# Patient Record
Sex: FEMALE | Race: WHITE | NOT HISPANIC OR LATINO | Employment: OTHER | URBAN - METROPOLITAN AREA
[De-identification: names, ages, dates, MRNs, and addresses within clinical notes are randomized per-mention and may not be internally consistent; named-entity substitution may affect disease eponyms.]

---

## 2023-09-01 ENCOUNTER — HOSPITAL ENCOUNTER (OUTPATIENT)
Dept: DATA CONVERSION | Facility: HOSPITAL | Age: 83
Discharge: HOME | End: 2023-09-01
Payer: MEDICARE

## 2023-09-01 DIAGNOSIS — Z12.31 ENCOUNTER FOR SCREENING MAMMOGRAM FOR MALIGNANT NEOPLASM OF BREAST: ICD-10-CM

## 2023-09-11 PROBLEM — K64.8 BLEEDING INTERNAL HEMORRHOIDS: Status: ACTIVE | Noted: 2023-09-11

## 2023-09-11 PROBLEM — G45.9 TRANSIENT ISCHEMIC ATTACK: Status: ACTIVE | Noted: 2023-09-11

## 2023-09-11 PROBLEM — M16.11 OSTEOARTHRITIS OF RIGHT HIP: Status: ACTIVE | Noted: 2023-09-11

## 2023-09-11 PROBLEM — R73.03 PREDIABETES: Status: ACTIVE | Noted: 2023-09-11

## 2023-09-11 PROBLEM — G45.4 TRANSIENT GLOBAL AMNESIA: Status: ACTIVE | Noted: 2023-09-11

## 2023-09-11 PROBLEM — C44.329 SQUAMOUS CELL CARCINOMA OF SKIN OF OTHER PARTS OF FACE: Status: ACTIVE | Noted: 2021-07-20

## 2023-09-11 PROBLEM — Z96.641 HISTORY OF TOTAL RIGHT HIP ARTHROPLASTY: Status: ACTIVE | Noted: 2023-09-11

## 2023-09-11 PROBLEM — M19.91 PRIMARY OSTEOARTHRITIS: Status: ACTIVE | Noted: 2023-09-11

## 2023-09-11 PROBLEM — E66.9 OBESITY WITH BODY MASS INDEX 30 OR GREATER: Status: ACTIVE | Noted: 2023-09-11

## 2023-09-11 PROBLEM — M16.10 PRIMARY LOCALIZED OSTEOARTHRITIS OF PELVIC REGION AND THIGH: Status: ACTIVE | Noted: 2023-09-11

## 2023-09-11 PROBLEM — D75.89 MACROCYTOSIS WITHOUT ANEMIA: Status: ACTIVE | Noted: 2023-09-11

## 2023-09-11 PROBLEM — E28.39 ESTROGEN DEFICIENCY: Status: ACTIVE | Noted: 2023-09-11

## 2023-09-11 PROBLEM — M16.12 PRIMARY OSTEOARTHRITIS OF LEFT HIP: Status: ACTIVE | Noted: 2023-09-11

## 2023-09-11 PROBLEM — E78.5 HYPERLIPIDEMIA: Status: ACTIVE | Noted: 2023-09-11

## 2023-09-11 PROBLEM — Z96.642 HISTORY OF TOTAL LEFT HIP ARTHROPLASTY: Status: ACTIVE | Noted: 2023-09-11

## 2023-09-11 PROBLEM — G47.00 INSOMNIA: Status: ACTIVE | Noted: 2023-09-11

## 2023-09-11 PROBLEM — N95.9 MENOPAUSAL AND POSTMENOPAUSAL DISORDER: Status: ACTIVE | Noted: 2023-09-11

## 2023-09-11 RX ORDER — LATANOPROST 50 UG/ML
SOLUTION/ DROPS OPHTHALMIC
COMMUNITY

## 2023-09-11 RX ORDER — FLUOROURACIL 50 MG/G
CREAM TOPICAL
COMMUNITY
Start: 2023-01-10

## 2023-09-11 RX ORDER — ATORVASTATIN CALCIUM 20 MG/1
TABLET, FILM COATED ORAL
COMMUNITY
End: 2024-03-07

## 2023-09-11 RX ORDER — ASPIRIN 81 MG/1
TABLET ORAL
COMMUNITY

## 2023-12-18 ENCOUNTER — TELEPHONE (OUTPATIENT)
Dept: PRIMARY CARE | Facility: CLINIC | Age: 83
End: 2023-12-18
Payer: MEDICARE

## 2023-12-18 DIAGNOSIS — U07.1 COVID: Primary | ICD-10-CM

## 2023-12-18 RX ORDER — NIRMATRELVIR AND RITONAVIR 300-100 MG
3 KIT ORAL 2 TIMES DAILY
Qty: 30 TABLET | Refills: 0 | Status: SHIPPED | OUTPATIENT
Start: 2023-12-18 | End: 2023-12-23

## 2023-12-18 RX ORDER — NIRMATRELVIR AND RITONAVIR 300-100 MG
3 KIT ORAL 2 TIMES DAILY
Qty: 30 TABLET | Refills: 0 | Status: SHIPPED | OUTPATIENT
Start: 2023-12-18 | End: 2023-12-18 | Stop reason: SDUPTHER

## 2023-12-18 NOTE — TELEPHONE ENCOUNTER
[facility] states that [facility] not have paxlovid. Requesting it be resent to an[facility]. New pharmacy added to chart.

## 2023-12-18 NOTE — TELEPHONE ENCOUNTER
Patient c/o sore throat, sinus congestion, headache, fever since 12/15. Taking tylenol OTC. COVID+ this morning. Please advise.

## 2024-03-05 ENCOUNTER — TELEPHONE (OUTPATIENT)
Dept: PRIMARY CARE | Facility: CLINIC | Age: 84
End: 2024-03-05
Payer: MEDICARE

## 2024-03-07 DIAGNOSIS — E78.2 MIXED HYPERLIPIDEMIA: Primary | ICD-10-CM

## 2024-03-07 RX ORDER — ATORVASTATIN CALCIUM 20 MG/1
20 TABLET, FILM COATED ORAL DAILY
Qty: 90 TABLET | Refills: 2 | Status: SHIPPED | OUTPATIENT
Start: 2024-03-07

## 2024-06-14 ENCOUNTER — LAB (OUTPATIENT)
Dept: LAB | Facility: LAB | Age: 84
End: 2024-06-14
Payer: MEDICARE

## 2024-06-14 DIAGNOSIS — R73.09 OTHER ABNORMAL GLUCOSE: ICD-10-CM

## 2024-06-14 DIAGNOSIS — E78.5 HYPERLIPIDEMIA, UNSPECIFIED: ICD-10-CM

## 2024-06-14 DIAGNOSIS — D75.89 OTHER SPECIFIED DISEASES OF BLOOD AND BLOOD-FORMING ORGANS: Primary | ICD-10-CM

## 2024-06-14 LAB
ALBUMIN SERPL-MCNC: 4.2 G/DL (ref 3.5–5)
ALP BLD-CCNC: 87 U/L (ref 35–125)
ALT SERPL-CCNC: 15 U/L (ref 5–40)
ANION GAP SERPL CALC-SCNC: 12 MMOL/L
AST SERPL-CCNC: 23 U/L (ref 5–40)
BILIRUB SERPL-MCNC: 0.4 MG/DL (ref 0.1–1.2)
BUN SERPL-MCNC: 26 MG/DL (ref 8–25)
CALCIUM SERPL-MCNC: 9.7 MG/DL (ref 8.5–10.4)
CHLORIDE SERPL-SCNC: 103 MMOL/L (ref 97–107)
CHOLEST SERPL-MCNC: 206 MG/DL (ref 133–200)
CHOLEST/HDLC SERPL: 2.6 {RATIO}
CO2 SERPL-SCNC: 25 MMOL/L (ref 24–31)
CREAT SERPL-MCNC: 0.9 MG/DL (ref 0.4–1.6)
EGFRCR SERPLBLD CKD-EPI 2021: 64 ML/MIN/1.73M*2
ERYTHROCYTE [DISTWIDTH] IN BLOOD BY AUTOMATED COUNT: 14.4 % (ref 11.5–14.5)
EST. AVERAGE GLUCOSE BLD GHB EST-MCNC: 123 MG/DL
GLUCOSE SERPL-MCNC: 117 MG/DL (ref 65–99)
HBA1C MFR BLD: 5.9 %
HCT VFR BLD AUTO: 43.1 % (ref 36–46)
HDLC SERPL-MCNC: 78 MG/DL
HGB BLD-MCNC: 14.4 G/DL (ref 12–16)
LDLC SERPL CALC-MCNC: 113 MG/DL (ref 65–130)
MCH RBC QN AUTO: 33.3 PG (ref 26–34)
MCHC RBC AUTO-ENTMCNC: 33.4 G/DL (ref 32–36)
MCV RBC AUTO: 100 FL (ref 80–100)
NRBC BLD-RTO: 0 /100 WBCS (ref 0–0)
PLATELET # BLD AUTO: 241 X10*3/UL (ref 150–450)
POTASSIUM SERPL-SCNC: 4.8 MMOL/L (ref 3.4–5.1)
PROT SERPL-MCNC: 6.7 G/DL (ref 5.9–7.9)
RBC # BLD AUTO: 4.33 X10*6/UL (ref 4–5.2)
SODIUM SERPL-SCNC: 140 MMOL/L (ref 133–145)
TRIGL SERPL-MCNC: 76 MG/DL (ref 40–150)
WBC # BLD AUTO: 7.6 X10*3/UL (ref 4.4–11.3)

## 2024-06-14 PROCEDURE — 85027 COMPLETE CBC AUTOMATED: CPT

## 2024-06-14 PROCEDURE — 80053 COMPREHEN METABOLIC PANEL: CPT

## 2024-06-14 PROCEDURE — 36415 COLL VENOUS BLD VENIPUNCTURE: CPT

## 2024-06-14 PROCEDURE — 83036 HEMOGLOBIN GLYCOSYLATED A1C: CPT

## 2024-06-14 PROCEDURE — 80061 LIPID PANEL: CPT

## 2024-06-18 PROBLEM — N95.9 MENOPAUSAL AND POSTMENOPAUSAL DISORDER: Status: RESOLVED | Noted: 2023-09-11 | Resolved: 2024-06-18

## 2024-06-18 PROBLEM — K64.8 BLEEDING INTERNAL HEMORRHOIDS: Status: RESOLVED | Noted: 2023-09-11 | Resolved: 2024-06-18

## 2024-06-18 PROBLEM — Z96.641 HISTORY OF TOTAL RIGHT HIP REPLACEMENT: Status: RESOLVED | Noted: 2024-06-18 | Resolved: 2024-06-18

## 2024-06-18 PROBLEM — M16.10 PRIMARY LOCALIZED OSTEOARTHRITIS OF PELVIC REGION AND THIGH: Status: RESOLVED | Noted: 2023-09-11 | Resolved: 2024-06-18

## 2024-06-18 PROBLEM — Z96.642 HISTORY OF TOTAL LEFT HIP ARTHROPLASTY: Status: RESOLVED | Noted: 2023-09-11 | Resolved: 2024-06-18

## 2024-06-18 PROBLEM — E66.9 OBESITY WITH BODY MASS INDEX 30 OR GREATER: Status: RESOLVED | Noted: 2023-09-11 | Resolved: 2024-06-18

## 2024-06-18 PROBLEM — Z96.641 HISTORY OF TOTAL RIGHT HIP REPLACEMENT: Status: ACTIVE | Noted: 2024-06-18

## 2024-06-18 PROBLEM — G45.4 TRANSIENT GLOBAL AMNESIA: Status: RESOLVED | Noted: 2023-09-11 | Resolved: 2024-06-18

## 2024-06-18 PROBLEM — Z96.641 HISTORY OF TOTAL RIGHT HIP ARTHROPLASTY: Status: RESOLVED | Noted: 2023-09-11 | Resolved: 2024-06-18

## 2024-06-18 PROBLEM — Z96.642 HISTORY OF TOTAL LEFT HIP REPLACEMENT: Status: ACTIVE | Noted: 2024-06-18

## 2024-06-18 PROBLEM — M16.11 OSTEOARTHRITIS OF RIGHT HIP: Status: RESOLVED | Noted: 2023-09-11 | Resolved: 2024-06-18

## 2024-06-18 PROBLEM — E28.39 ESTROGEN DEFICIENCY: Status: RESOLVED | Noted: 2023-09-11 | Resolved: 2024-06-18

## 2024-06-18 PROBLEM — C44.329 SQUAMOUS CELL CARCINOMA OF SKIN OF OTHER PARTS OF FACE: Status: RESOLVED | Noted: 2021-07-20 | Resolved: 2024-06-18

## 2024-06-18 PROBLEM — M16.12 PRIMARY OSTEOARTHRITIS OF LEFT HIP: Status: RESOLVED | Noted: 2023-09-11 | Resolved: 2024-06-18

## 2024-06-18 PROBLEM — Z20.822 CONTACT WITH AND (SUSPECTED) EXPOSURE TO COVID-19: Status: ACTIVE | Noted: 2023-02-21

## 2024-06-18 PROBLEM — Z96.642 HISTORY OF TOTAL LEFT HIP REPLACEMENT: Status: RESOLVED | Noted: 2024-06-18 | Resolved: 2024-06-18

## 2024-06-18 PROBLEM — Z20.822 CONTACT WITH AND (SUSPECTED) EXPOSURE TO COVID-19: Status: RESOLVED | Noted: 2023-02-21 | Resolved: 2024-06-18

## 2024-06-18 PROBLEM — G45.9 TRANSIENT ISCHEMIC ATTACK: Status: RESOLVED | Noted: 2023-09-11 | Resolved: 2024-06-18

## 2024-06-18 PROBLEM — H90.3 SENSORINEURAL HEARING LOSS, BILATERAL: Status: ACTIVE | Noted: 2017-11-08

## 2024-06-18 NOTE — PROGRESS NOTES
[facility]: [facility]  MEDICARE WELLNESS EXAM      Martha Cooley is a 84 y.o. female that is presenting today for Annual Exam.    Assessment/Plan    Diagnoses and all orders for this visit:  Annual physical exam  Other screening mammogram  -     BI mammo bilateral screening tomosynthesis; Future  Mixed hyperlipidemia  -     Lipid Panel; Future  -     Comprehensive Metabolic Panel; Future  -     TSH with reflex to Free T4 if abnormal; Future  Primary osteoarthritis of right hip  Prediabetes  Encounter for routine laboratory testing  -     Hemoglobin A1C; Future  Pre-diabetes  -     Hemoglobin A1C; Future  Other orders  -     Follow Up In Primary Care - Medicare Annual; Future  We completed the medicare wellness exam today.  The lifestyle is reviewed and recommendations for diet and exercise are made. We reviewed the immunizations and cancer screening and recommendations for needed immunizations and screenings are made.  The patient is independent in all ADL, shows no clinical signs of cognitive impairment, and is not falling or at risk for such.     In terms of her screenings here are some more specifics.  She is over the age of 82 so she does not need to do any further colon cancer screening.  Last mammogram was in September and another 1 is ordered for this year.  She had a bone density test in 2022 which was totally normal.  I would not plan to do another 1 of those until 2027.  We looked at her immunizations as well she had a Tdap in 2014 and is due for such I recommended that she get that at a local pharmacy.  Her Shingrix vaccines were given x 2 in 2018 and Pneumovax in 2022 meaning that those are fully up-to-date.    We reviewed her list of doctors that she keeps follow-up with.  It is a relatively small list.  She sees a dermatologist, orthopedics, and ophthalmology.  I have included their names in the epic section on the care team.    We took some time to evaluate/manage her  chronic medical problems.  We reviewed the blood work that she most recently had done.  I am glad to report that her prediabetes is stable as it has been for years with no needs to make any changes.  Her cholesterol is also very stable on her current medication with no need to make any changes.  In that regard we will just see her back in 1 year follow-up.    I will plan on seeing her in a year unless she needs me sooner    ADVANCED CARE PLANNING  Advanced Care Planning was discussed with patient:  The patient has an active advanced care plan on file. The patient has an active surrogate decision-maker on file.  Encouraged the patient to confirm that Living Will and Healthcare Power of  (HCPoA) are accurate and up to date.  Encouraged the patient to confirm that our office be provided a copy of any documentation in the event that anything changes.    ACTIVITIES OF DAILY LIVING  Basic ADLs:  Bathing: Independent, Dressing: Independent, Toileting: Independent, Transferring: Independent, Continence: Independent, Feeding: Independent.    Instrumental ADLs:  Ability to use phone: Independent, Shopping: Independent, Cooking: Independent, House-keeping: Independent, Laundry: Independent, Transportation: Independent, Medication Management: Independent, Finance Management: Independent.    Subjective   She is here for her Medicare annual wellness visit and follow-up for her chronic medical problems.  She is really been doing very well since the time that I last saw her her arthritis pains are under good control status post joint replacement surgeries.  She is tolerating her cholesterol-lowering medicine and brings forth really no new issues today.    She has not been falling      Review of Systems   Constitutional: Negative.    HENT: Negative.     Eyes: Negative.    Respiratory: Negative.     Cardiovascular: Negative.    Gastrointestinal: Negative.    Endocrine: Negative.    Genitourinary: Negative.    Musculoskeletal:  Negative.         Hips feel really good s/p joint replacment   Skin: Negative.    Allergic/Immunologic: Negative.    Neurological: Negative.    Hematological: Negative.    Psychiatric/Behavioral: Negative.     All other systems reviewed and are negative.    Objective   Vitals:    06/19/24 0911   BP: 138/78   Pulse: 65   Temp: 36.4 °C (97.5 °F)   SpO2: 97%      Body mass index is 33.8 kg/m².  Physical Exam  Vitals and nursing note reviewed.   Constitutional:       General: She is not in acute distress.     Appearance: Normal appearance. She is not ill-appearing.   HENT:      Head: Normocephalic and atraumatic.      Right Ear: Hearing, tympanic membrane, ear canal and external ear normal. There is no impacted cerumen.      Left Ear: Hearing, tympanic membrane, ear canal and external ear normal. There is no impacted cerumen.      Nose: Nose normal.      Mouth/Throat:      Mouth: Mucous membranes are moist.      Pharynx: Oropharynx is clear. No oropharyngeal exudate or posterior oropharyngeal erythema.   Eyes:      General: No scleral icterus.        Right eye: No discharge.         Left eye: No discharge.      Extraocular Movements: Extraocular movements intact.      Conjunctiva/sclera: Conjunctivae normal.      Pupils: Pupils are equal, round, and reactive to light.   Neck:      Vascular: No carotid bruit.   Cardiovascular:      Rate and Rhythm: Normal rate and regular rhythm.      Pulses: Normal pulses.      Heart sounds: Normal heart sounds. No murmur heard.     No friction rub. No gallop.   Pulmonary:      Effort: Pulmonary effort is normal. No respiratory distress.      Breath sounds: Normal breath sounds.   Abdominal:      General: Abdomen is flat. Bowel sounds are normal. There is no distension.      Palpations: Abdomen is soft.      Tenderness: There is no abdominal tenderness.      Hernia: No hernia is present.   Musculoskeletal:         General: No swelling or tenderness. Normal range of motion.  "  Lymphadenopathy:      Cervical: No cervical adenopathy.   Skin:     General: Skin is warm and dry.      Capillary Refill: Capillary refill takes less than 2 seconds.      Coloration: Skin is not jaundiced.      Findings: No rash.   Neurological:      General: No focal deficit present.      Mental Status: She is alert and oriented to person, place, and time. Mental status is at baseline.   Psychiatric:         Mood and Affect: Mood normal.         Behavior: Behavior normal.       Diagnostic Results   Lab Results   Component Value Date    GLUCOSE 117 (H) 06/14/2024    CALCIUM 9.7 06/14/2024     06/14/2024    K 4.8 06/14/2024    CO2 25 06/14/2024     06/14/2024    BUN 26 (H) 06/14/2024    CREATININE 0.90 06/14/2024     Lab Results   Component Value Date    ALT 15 06/14/2024    AST 23 06/14/2024    ALKPHOS 87 06/14/2024    BILITOT 0.4 06/14/2024     Lab Results   Component Value Date    WBC 7.6 06/14/2024    HGB 14.4 06/14/2024    HCT 43.1 06/14/2024     06/14/2024     06/14/2024     Lab Results   Component Value Date    CHOL 206 (H) 06/14/2024    CHOL 190 06/08/2023    CHOL 189 02/22/2023     Lab Results   Component Value Date    HDL 78.0 06/14/2024    HDL 82 06/08/2023    HDL 76 02/22/2023     Lab Results   Component Value Date    LDLCALC 113 06/14/2024    LDLCALC 97 06/08/2023    LDLCALC 96 02/22/2023     Lab Results   Component Value Date    TRIG 76 06/14/2024    TRIG 56 06/08/2023    TRIG 87 02/22/2023     No components found for: \"CHOLHDL\"  Lab Results   Component Value Date    HGBA1C 5.9 (H) 06/14/2024     Other labs not included in the list above reviewed either before or during this encounter.    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/11/2023    History of total left hip replacement 06/18/2024    History of total right hip arthroplasty 09/11/2023    History of total right hip " replacement 2024    Hyperlipidemia 2023    Insomnia 2023    Macrocytosis without anemia 2023    Osteoarthritis of right hip 2023    Prediabetes 2023    Primary localized osteoarthritis of pelvic region and thigh 2023    Primary osteoarthritis 2023    Primary osteoarthritis of left hip 2023    Sensorineural hearing loss, bilateral 2017    Squamous cell carcinoma of skin of other parts of face 2021    Transient global amnesia 2023    Transient ischemic attack 2023     Past Surgical History:   Procedure Laterality Date    CATARACT EXTRACTION Bilateral 2018    COLONOSCOPY  2006    also , 2016    HYSTEROSCOPY      MR HEAD ANGIO WO IV CONTRAST  2023    MR HEAD ANGIO WO IV CONTRAST [facility]    TOTAL HIP ARTHROPLASTY Right     TOTAL HIP ARTHROPLASTY Left      Family History   Problem Relation Name Age of Onset    Ovarian cancer Mother           of    Heart disease Father      Diabetes Father      Breast cancer Sister       Social History     Socioeconomic History    Marital status:      Spouse name: Not on file    Number of children: Not on file    Years of education: Not on file    Highest education level: Not on file   Occupational History    Not on file   Tobacco Use    Smoking status: Never    Smokeless tobacco: Never   Vaping Use    Vaping status: Never Used   Substance and Sexual Activity    Alcohol use: Defer    Drug use: Never    Sexual activity: Not on file   Other Topics Concern    Not on file   Social History Narrative    Not on file     Social Determinants of Health     Financial Resource Strain: Not on file   Food Insecurity: Not on file   Transportation Needs: Not on file   Physical Activity: Not on file   Stress: Not on file   Social Connections: Not on file   Intimate Partner Violence: Not on file   Housing Stability: Not on file     No Known Allergies  Current Outpatient Medications on  File Prior to Visit   Medication Sig Dispense Refill    atorvastatin (Lipitor) 20 mg tablet TAKE 1 TABLET BY MOUTH EVERY DAY 90 tablet 2    folic acid/multivit-min/lutein (CENTRUM SILVER ORAL) as directed Orally      latanoprost (Xalatan) 0.005 % ophthalmic solution 1 drop into affected eye in the evening Ophthalmic Once a day      NON FORMULARY Vitamin D 25 MCG (1000 UT) - 1 tablet Orally Once a day      aspirin 81 mg EC tablet 1 tablet Orally Once a day      fluorouracil (Efudex) 5 % cream Apply twice daily for 14 days to the upper lip       No current facility-administered medications on file prior to visit.     Immunization History   Administered Date(s) Administered    Flu vaccine (IIV4), preservative free *Check age/dose* 12/27/2021    Flu vaccine, quadrivalent, high-dose, preservative free, age 65y+ (FLUZONE) 10/27/2020, 10/27/2022, 10/17/2023    Influenza, High Dose Seasonal, Preservative Free 12/14/2011, 01/15/2013, 10/30/2013, 09/11/2014, 09/28/2016, 10/04/2017, 10/17/2018, 10/13/2019    Influenza, seasonal, injectable 11/11/2008, 11/11/2010, 10/20/2015    Moderna SARS-CoV-2 Vaccination 02/05/2021, 03/05/2021, 11/12/2021, 05/11/2022    Pfizer COVID-19 vaccine, Fall 2023, 12 years and older, (30mcg/0.3mL) 11/28/2023    Pfizer COVID-19 vaccine, bivalent, age 12 years and older (30 mcg/0.3 mL) 09/20/2022    Pneumococcal conjugate vaccine, 13-valent (PREVNAR 13) 03/04/2015    Pneumococcal conjugate vaccine, 20-valent (PREVNAR 20) 12/07/2022    Pneumococcal polysaccharide vaccine, 23-valent, age 2 years and older (PNEUMOVAX 23) 05/11/2007, 06/19/2012    Td vaccine, age 7 years and older (TDVAX) 03/08/2004    Tdap vaccine, age 7 year and older (BOOSTRIX, ADACEL) 01/21/2014    Zoster vaccine, recombinant, adult (SHINGRIX) 04/02/2018, 04/09/2018, 08/06/2018, 09/11/2018    Zoster, live 05/11/2007     Patient's medical history was reviewed and updated either before or during this encounter.     Gui Glez,  MD

## 2024-06-19 ENCOUNTER — OFFICE VISIT (OUTPATIENT)
Dept: PRIMARY CARE | Facility: CLINIC | Age: 84
End: 2024-06-19
Payer: MEDICARE

## 2024-06-19 VITALS
OXYGEN SATURATION: 97 % | TEMPERATURE: 97.5 F | DIASTOLIC BLOOD PRESSURE: 78 MMHG | SYSTOLIC BLOOD PRESSURE: 138 MMHG | HEART RATE: 65 BPM | BODY MASS INDEX: 33.8 KG/M2 | WEIGHT: 200 LBS

## 2024-06-19 DIAGNOSIS — Z00.00 ANNUAL PHYSICAL EXAM: Primary | ICD-10-CM

## 2024-06-19 DIAGNOSIS — Z12.31 OTHER SCREENING MAMMOGRAM: ICD-10-CM

## 2024-06-19 DIAGNOSIS — R73.03 PREDIABETES: ICD-10-CM

## 2024-06-19 DIAGNOSIS — R73.03 PRE-DIABETES: ICD-10-CM

## 2024-06-19 DIAGNOSIS — M16.11 PRIMARY OSTEOARTHRITIS OF RIGHT HIP: ICD-10-CM

## 2024-06-19 DIAGNOSIS — Z01.89 ENCOUNTER FOR ROUTINE LABORATORY TESTING: ICD-10-CM

## 2024-06-19 DIAGNOSIS — E78.2 MIXED HYPERLIPIDEMIA: ICD-10-CM

## 2024-06-19 PROCEDURE — 99213 OFFICE O/P EST LOW 20 MIN: CPT | Performed by: INTERNAL MEDICINE

## 2024-06-19 PROCEDURE — 1123F ACP DISCUSS/DSCN MKR DOCD: CPT | Performed by: INTERNAL MEDICINE

## 2024-06-19 PROCEDURE — 1159F MED LIST DOCD IN RCRD: CPT | Performed by: INTERNAL MEDICINE

## 2024-06-19 PROCEDURE — 1157F ADVNC CARE PLAN IN RCRD: CPT | Performed by: INTERNAL MEDICINE

## 2024-06-19 PROCEDURE — 1160F RVW MEDS BY RX/DR IN RCRD: CPT | Performed by: INTERNAL MEDICINE

## 2024-06-19 PROCEDURE — 1158F ADVNC CARE PLAN TLK DOCD: CPT | Performed by: INTERNAL MEDICINE

## 2024-06-19 PROCEDURE — 99215 OFFICE O/P EST HI 40 MIN: CPT | Performed by: INTERNAL MEDICINE

## 2024-06-19 PROCEDURE — 1036F TOBACCO NON-USER: CPT | Performed by: INTERNAL MEDICINE

## 2024-06-19 PROCEDURE — G0439 PPPS, SUBSEQ VISIT: HCPCS | Performed by: INTERNAL MEDICINE

## 2024-06-19 PROCEDURE — 1126F AMNT PAIN NOTED NONE PRSNT: CPT | Performed by: INTERNAL MEDICINE

## 2024-06-19 ASSESSMENT — ENCOUNTER SYMPTOMS
OCCASIONAL FEELINGS OF UNSTEADINESS: 0
GASTROINTESTINAL NEGATIVE: 1
DEPRESSION: 0
PSYCHIATRIC NEGATIVE: 1
MUSCULOSKELETAL NEGATIVE: 1
ALLERGIC/IMMUNOLOGIC NEGATIVE: 1
NEUROLOGICAL NEGATIVE: 1
ENDOCRINE NEGATIVE: 1
LOSS OF SENSATION IN FEET: 0
CONSTITUTIONAL NEGATIVE: 1
HEMATOLOGIC/LYMPHATIC NEGATIVE: 1
RESPIRATORY NEGATIVE: 1
CARDIOVASCULAR NEGATIVE: 1
EYES NEGATIVE: 1

## 2024-06-19 ASSESSMENT — PAIN SCALES - GENERAL: PAINLEVEL: 0-NO PAIN

## 2024-06-19 ASSESSMENT — PATIENT HEALTH QUESTIONNAIRE - PHQ9
2. FEELING DOWN, DEPRESSED OR HOPELESS: NOT AT ALL
1. LITTLE INTEREST OR PLEASURE IN DOING THINGS: NOT AT ALL
SUM OF ALL RESPONSES TO PHQ9 QUESTIONS 1 AND 2: 0

## 2024-06-19 NOTE — PATIENT INSTRUCTIONS
We had a chance today to do your Medicare wellness exam as well as to follow-up on your cholesterol/medical problems.    Some specifics in terms of your annual wellness visit with Medicare:   1.  Cancer screenings-you do not need to do any further colon cancer screening at your age.  You are due for mammogram in September and have ordered this for you as well.  2.  Immunizations-you are due for the shot called Tdap which is tetanus and whooping cough as I rec mended and wrote on the little sheet that I handed you get that at a local pharmacy will be cheaper for you to do it that way.  That the shot you should have every 10 years and your last one was given in 2014.  Your pneumonia shot was given in 2022 and is fully up-to-date your Shingrix shingles vaccines were given in 2018 and are fully up-to-date and should not require any further boosters.  3.  Advanced directives-you indicated that you do in fact have these in place.    We took time to review your recent blood work and evaluate/manage your cholesterol.  You doing very well on your current dose of cholesterol-lowering medicine and I would continue that as is.  Your prediabetes is also stable with us needing to make no changes.    Continue to stay healthy I will plan on seeing you next year unless you need me.

## 2024-09-03 ENCOUNTER — HOSPITAL ENCOUNTER (OUTPATIENT)
Dept: RADIOLOGY | Facility: CLINIC | Age: 84
Discharge: HOME | End: 2024-09-03
Payer: MEDICARE

## 2024-09-03 VITALS — HEIGHT: 64 IN | WEIGHT: 198 LBS | BODY MASS INDEX: 33.8 KG/M2

## 2024-09-03 DIAGNOSIS — Z12.31 OTHER SCREENING MAMMOGRAM: ICD-10-CM

## 2024-09-03 PROCEDURE — 77063 BREAST TOMOSYNTHESIS BI: CPT | Performed by: RADIOLOGY

## 2024-09-03 PROCEDURE — 77067 SCR MAMMO BI INCL CAD: CPT

## 2024-09-03 PROCEDURE — 77067 SCR MAMMO BI INCL CAD: CPT | Performed by: RADIOLOGY

## 2024-11-22 DIAGNOSIS — E78.2 MIXED HYPERLIPIDEMIA: ICD-10-CM

## 2024-11-22 RX ORDER — ATORVASTATIN CALCIUM 20 MG/1
20 TABLET, FILM COATED ORAL DAILY
Qty: 90 TABLET | Refills: 3 | Status: SHIPPED | OUTPATIENT
Start: 2024-11-22

## 2024-11-25 DIAGNOSIS — E78.2 MIXED HYPERLIPIDEMIA: ICD-10-CM

## 2024-11-25 RX ORDER — ATORVASTATIN CALCIUM 20 MG/1
20 TABLET, FILM COATED ORAL DAILY
Qty: 90 TABLET | Refills: 3 | Status: SHIPPED | OUTPATIENT
Start: 2024-11-25

## 2025-01-13 ENCOUNTER — TELEPHONE (OUTPATIENT)
Dept: PRIMARY CARE | Facility: CLINIC | Age: 85
End: 2025-01-13
Payer: MEDICARE

## 2025-01-13 DIAGNOSIS — M79.672 FOOT PAIN, LEFT: ICD-10-CM

## 2025-03-11 ENCOUNTER — OFFICE VISIT (OUTPATIENT)
Dept: URGENT CARE | Age: 85
End: 2025-03-11
Payer: MEDICARE

## 2025-03-11 VITALS
OXYGEN SATURATION: 96 % | DIASTOLIC BLOOD PRESSURE: 83 MMHG | HEART RATE: 82 BPM | SYSTOLIC BLOOD PRESSURE: 149 MMHG | RESPIRATION RATE: 20 BRPM | TEMPERATURE: 98 F

## 2025-03-11 DIAGNOSIS — N30.00 ACUTE CYSTITIS WITHOUT HEMATURIA: Primary | ICD-10-CM

## 2025-03-11 DIAGNOSIS — J02.9 ACUTE PHARYNGITIS, UNSPECIFIED ETIOLOGY: ICD-10-CM

## 2025-03-11 DIAGNOSIS — J06.9 UPPER RESPIRATORY TRACT INFECTION, UNSPECIFIED TYPE: ICD-10-CM

## 2025-03-11 LAB
POC APPEARANCE, URINE: ABNORMAL
POC BILIRUBIN, URINE: NEGATIVE
POC BLOOD, URINE: NEGATIVE
POC COLOR, URINE: YELLOW
POC GLUCOSE, URINE: NEGATIVE MG/DL
POC KETONES, URINE: NEGATIVE MG/DL
POC LEUKOCYTES, URINE: ABNORMAL
POC NITRITE,URINE: NEGATIVE
POC PH, URINE: 6.5 PH
POC PROTEIN, URINE: NEGATIVE MG/DL
POC RAPID STREP: NEGATIVE
POC SPECIFIC GRAVITY, URINE: 1.02
POC UROBILINOGEN, URINE: 0.2 EU/DL

## 2025-03-11 PROCEDURE — 1160F RVW MEDS BY RX/DR IN RCRD: CPT | Performed by: NURSE PRACTITIONER

## 2025-03-11 PROCEDURE — 99204 OFFICE O/P NEW MOD 45 MIN: CPT | Performed by: NURSE PRACTITIONER

## 2025-03-11 PROCEDURE — 1159F MED LIST DOCD IN RCRD: CPT | Performed by: NURSE PRACTITIONER

## 2025-03-11 PROCEDURE — 81003 URINALYSIS AUTO W/O SCOPE: CPT | Performed by: NURSE PRACTITIONER

## 2025-03-11 PROCEDURE — 87880 STREP A ASSAY W/OPTIC: CPT | Performed by: NURSE PRACTITIONER

## 2025-03-11 PROCEDURE — 1036F TOBACCO NON-USER: CPT | Performed by: NURSE PRACTITIONER

## 2025-03-11 PROCEDURE — 1157F ADVNC CARE PLAN IN RCRD: CPT | Performed by: NURSE PRACTITIONER

## 2025-03-11 PROCEDURE — 1126F AMNT PAIN NOTED NONE PRSNT: CPT | Performed by: NURSE PRACTITIONER

## 2025-03-11 RX ORDER — CEPHALEXIN 500 MG/1
500 CAPSULE ORAL 2 TIMES DAILY
Qty: 14 CAPSULE | Refills: 0 | Status: SHIPPED | OUTPATIENT
Start: 2025-03-11 | End: 2025-03-18

## 2025-03-11 RX ORDER — BENZONATATE 200 MG/1
200 CAPSULE ORAL 3 TIMES DAILY PRN
Qty: 20 CAPSULE | Refills: 0 | Status: SHIPPED | OUTPATIENT
Start: 2025-03-11 | End: 2025-03-18

## 2025-03-11 ASSESSMENT — ENCOUNTER SYMPTOMS
BACK PAIN: 1
TROUBLE SWALLOWING: 1
FATIGUE: 1
COUGH: 1
SORE THROAT: 1

## 2025-03-11 ASSESSMENT — PAIN SCALES - GENERAL: PAINLEVEL_OUTOF10: 0-NO PAIN

## 2025-03-11 NOTE — PROGRESS NOTES
Subjective   Patient ID: Martha Cooley is a 85 y.o. female. They present today with a chief complaint of Cough (Saturday had sorethroat. Took zicam. Coughing productive for green mucus. Lower back hurts.).    History of Present Illness  Patient presents today with a chief complaint of Cough, Saturday had sorethroat. Took zicam. Cough is productive for green mucus. Reports ne onset of lower back pain since Saturday as well.     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    History of total left hip replacement 06/18/2024    History of total right hip arthroplasty 09/11/2023    History of total right hip replacement 06/18/2024    Hyperlipidemia 09/11/2023    Insomnia 09/11/2023    Macrocytosis without anemia 09/11/2023    Osteoarthritis of right hip 09/11/2023    Prediabetes 09/11/2023    Primary localized osteoarthritis of pelvic region and thigh 09/11/2023    Primary osteoarthritis 09/11/2023    Primary osteoarthritis of left hip 09/11/2023    Sensorineural hearing loss, bilateral 11/08/2017    Squamous cell carcinoma of skin of other parts of face 07/20/2021    Transient global amnesia 09/11/2023    Transient ischemic attack 09/11/2023       Past Surgical History:   Procedure Laterality Date    CATARACT EXTRACTION Bilateral 2018    COLONOSCOPY  2006    also 2011, 2016    HYSTEROSCOPY  2013    MR HEAD ANGIO WO IV CONTRAST  02/22/2023    MR HEAD ANGIO WO IV CONTRAST [facility]    TOTAL HIP ARTHROPLASTY Right 2021    TOTAL HIP ARTHROPLASTY Left 2022        reports that she has never smoked. She has never used smokeless tobacco. She reports current alcohol use. She reports that she does not use drugs.    Review of Systems  Review of Systems   Constitutional:  Positive for fatigue.   HENT:  Positive for  postnasal drip, sore throat and trouble swallowing.    Respiratory:  Positive for cough.    Musculoskeletal:  Positive for back pain.   All other systems reviewed and are negative.                                 Objective    Vitals:    03/11/25 1101   BP: 149/83   Pulse: 82   Resp: 20   Temp: 36.7 °C (98 °F)   TempSrc: Oral   SpO2: 96%     No LMP recorded. Patient is postmenopausal.        Procedures    Point of Care Test & Imaging Results from this visit  Results for orders placed or performed in visit on 03/11/25   POCT UA Automated manually resulted   Result Value Ref Range    POC Color, Urine Yellow Straw, Yellow, Light-Yellow    POC Appearance, Urine Hazy (A) Clear    POC Glucose, Urine NEGATIVE NEGATIVE mg/dl    POC Bilirubin, Urine NEGATIVE NEGATIVE    POC Ketones, Urine NEGATIVE NEGATIVE mg/dl    POC Specific Gravity, Urine 1.020 1.005 - 1.035    POC Blood, Urine NEGATIVE NEGATIVE    POC PH, Urine 6.5 No Reference Range Established PH    POC Protein, Urine NEGATIVE NEGATIVE mg/dl    POC Urobilinogen, Urine 0.2 0.2, 1.0 EU/DL    Poc Nitrite, Urine NEGATIVE NEGATIVE    POC Leukocytes, Urine TRACE (A) NEGATIVE   POCT rapid strep A manually resulted   Result Value Ref Range    POC Rapid Strep Negative Negative      No results found.    Diagnostic study results (if any) were reviewed by ROBERT Bryant.    Assessment/Plan   Allergies, medications, history, and pertinent labs/EKGs/Imaging reviewed by ROBERT Bryant.     Medical Decision Making  Concern today for Acute pharyngitis in the setting of URI vs Strep. Also concern for UTI given new back pain, fatigue. UA in the office positive for trace leuk, hazy colored. Will send out Urine cultures. In the setting of symptoms of green and yellow discharge with cough, ongoing acute pharyngitis, fatigue and back pain, will start on Keflex today to cover for atypical URI and acute cystitis.   Added benzonatate to help with dry cough, most likely due to  irritation in the setting of acute pharyngitis.     Orders and Diagnoses  Diagnoses and all orders for this visit:  Acute cystitis without hematuria  -     POCT UA Automated manually resulted  -     Urine Culture  -     cephalexin (Keflex) 500 mg capsule; Take 1 capsule (500 mg) by mouth 2 times a day for 7 days.  Acute pharyngitis, unspecified etiology  -     POCT rapid strep A manually resulted  -     cephalexin (Keflex) 500 mg capsule; Take 1 capsule (500 mg) by mouth 2 times a day for 7 days.  Upper respiratory tract infection, unspecified type  -     cephalexin (Keflex) 500 mg capsule; Take 1 capsule (500 mg) by mouth 2 times a day for 7 days.  -     benzonatate (Tessalon) 200 mg capsule; Take 1 capsule (200 mg) by mouth 3 times a day as needed for cough for up to 7 days. Do not crush or chew.      Medical Admin Record      Patient disposition: Home    Electronically signed by ROBERT Bryant  12:00 PM

## 2025-03-13 ENCOUNTER — TELEPHONE (OUTPATIENT)
Dept: PRIMARY CARE | Facility: CLINIC | Age: 85
End: 2025-03-13
Payer: MEDICARE

## 2025-03-13 NOTE — PROGRESS NOTES
"[facility]: [facility]  PROGRESS NOTE      Martha Cooley is a 85 y.o. female that is presenting today for c/o of cough and sore throat x 6 days    Assessment/Plan   {Assess/Plan SmartLinks (Optional):66653}  Subjective   HPI  Review of Systems   Objective   There were no vitals filed for this visit.   There is no height or weight on file to calculate BMI.  Physical Exam  Diagnostic Results   Lab Results   Component Value Date    GLUCOSE 117 (H) 06/14/2024    CALCIUM 9.7 06/14/2024     06/14/2024    K 4.8 06/14/2024    CO2 25 06/14/2024     06/14/2024    BUN 26 (H) 06/14/2024    CREATININE 0.90 06/14/2024     Lab Results   Component Value Date    ALT 15 06/14/2024    AST 23 06/14/2024    ALKPHOS 87 06/14/2024    BILITOT 0.4 06/14/2024     Lab Results   Component Value Date    WBC 7.6 06/14/2024    HGB 14.4 06/14/2024    HCT 43.1 06/14/2024     06/14/2024     06/14/2024     Lab Results   Component Value Date    CHOL 206 (H) 06/14/2024    CHOL 190 06/08/2023    CHOL 189 02/22/2023     Lab Results   Component Value Date    HDL 78.0 06/14/2024    HDL 82 06/08/2023    HDL 76 02/22/2023     Lab Results   Component Value Date    LDLCALC 113 06/14/2024    LDLCALC 97 06/08/2023    LDLCALC 96 02/22/2023     Lab Results   Component Value Date    TRIG 76 06/14/2024    TRIG 56 06/08/2023    TRIG 87 02/22/2023     No components found for: \"CHOLHDL\"  Lab Results   Component Value Date    HGBA1C 5.9 (H) 06/14/2024     Other labs not included in the list above were reviewed either before or during this encounter.    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/11/2023    History of total left hip replacement 06/18/2024    History of total right hip arthroplasty 09/11/2023    History of total right hip replacement 06/18/2024    Hyperlipidemia 09/11/2023    Insomnia 09/11/2023    " Macrocytosis without anemia 2023    Osteoarthritis of right hip 2023    Prediabetes 2023    Primary localized osteoarthritis of pelvic region and thigh 2023    Primary osteoarthritis 2023    Primary osteoarthritis of left hip 2023    Sensorineural hearing loss, bilateral 2017    Squamous cell carcinoma of skin of other parts of face 2021    Transient global amnesia 2023    Transient ischemic attack 2023     Past Surgical History:   Procedure Laterality Date    CATARACT EXTRACTION Bilateral 2018    COLONOSCOPY  2006    also ,     HYSTEROSCOPY      MR HEAD ANGIO WO IV CONTRAST  2023    MR HEAD ANGIO WO IV CONTRAST [facility]    TOTAL HIP ARTHROPLASTY Right     TOTAL HIP ARTHROPLASTY Left      Family History   Problem Relation Name Age of Onset    Ovarian cancer Mother           of    Heart disease Father      Diabetes Father      Breast cancer Sister       Social History     Socioeconomic History    Marital status:      Spouse name: Not on file    Number of children: Not on file    Years of education: Not on file    Highest education level: Not on file   Occupational History    Not on file   Tobacco Use    Smoking status: Never    Smokeless tobacco: Never   Vaping Use    Vaping status: Never Used   Substance and Sexual Activity    Alcohol use: Yes     Comment: social    Drug use: Never    Sexual activity: Not on file   Other Topics Concern    Not on file   Social History Narrative    Not on file     Social Drivers of Health     Financial Resource Strain: Not on file   Food Insecurity: Not on file   Transportation Needs: Not on file   Physical Activity: Not on file   Stress: Not on file   Social Connections: Not on file   Intimate Partner Violence: Not on file   Housing Stability: Not on file     No Known Allergies  Current Outpatient Medications on File Prior to Visit   Medication Sig Dispense Refill    aspirin  81 mg EC tablet 1 tablet Orally Once a day (Patient not taking: Reported on 3/11/2025)      atorvastatin (Lipitor) 20 mg tablet Take 1 tablet (20 mg) by mouth once daily. 90 tablet 3    benzonatate (Tessalon) 200 mg capsule Take 1 capsule (200 mg) by mouth 3 times a day as needed for cough for up to 7 days. Do not crush or chew. 20 capsule 0    cephalexin (Keflex) 500 mg capsule Take 1 capsule (500 mg) by mouth 2 times a day for 7 days. 14 capsule 0    fluorouracil (Efudex) 5 % cream Apply twice daily for 14 days to the upper lip (Patient not taking: Reported on 3/11/2025)      folic acid/multivit-min/lutein (CENTRUM SILVER ORAL) as directed Orally      latanoprost (Xalatan) 0.005 % ophthalmic solution 1 drop into affected eye in the evening Ophthalmic Once a day      NON FORMULARY Vitamin D 25 MCG (1000 UT) - 1 tablet Orally Once a day       No current facility-administered medications on file prior to visit.     Immunization History   Administered Date(s) Administered    Flu vaccine (IIV4), preservative free *Check age/dose* 12/27/2021    Flu vaccine, quadrivalent, high-dose, preservative free, age 65y+ (FLUZONE) 10/27/2020, 10/27/2022, 10/17/2023    Flu vaccine, trivalent, preservative free, HIGH-DOSE, age 65y+ (Fluzone) 12/14/2011, 01/15/2013, 10/30/2013, 09/11/2014, 09/28/2016, 10/04/2017, 10/17/2018, 10/13/2019    Influenza, seasonal, injectable 11/11/2008, 11/11/2010, 10/20/2015    Moderna COVID-19 vaccine, 12 years and older (50mcg/0.5mL)(Spikevax) 10/29/2024    Moderna SARS-CoV-2 Vaccination 02/05/2021, 03/05/2021, 11/12/2021, 05/11/2022    Pfizer COVID-19 vaccine, 12 years and older, (30mcg/0.3mL) (Comirnaty) 11/28/2023    Pfizer COVID-19 vaccine, bivalent, age 12 years and older (30 mcg/0.3 mL) 09/20/2022    Pneumococcal conjugate vaccine, 13-valent (PREVNAR 13) 03/04/2015    Pneumococcal conjugate vaccine, 20-valent (PREVNAR 20) 12/07/2022    Pneumococcal polysaccharide vaccine, 23-valent, age 2 years  and older (PNEUMOVAX 23) 05/11/2007, 06/19/2012    Td vaccine, age 7 years and older (TDVAX) 03/08/2004    Tdap vaccine, age 7 year and older (BOOSTRIX, ADACEL) 01/21/2014    Zoster vaccine, recombinant, adult (SHINGRIX) 04/02/2018, 04/09/2018, 08/06/2018, 09/11/2018    Zoster, live 05/11/2007     Patient's medical history was reviewed and updated either before or during this encounter.       Fatoumata Vega, APRN-CNP

## 2025-03-13 NOTE — TELEPHONE ENCOUNTER
Pt made appt before [facility] to [facility]. Spouse states pt is starting to feel better. Fatoumata advises pt to finish medications prescribed at [facility] and if her symptoms worsen to call the office.

## 2025-03-14 ENCOUNTER — APPOINTMENT (OUTPATIENT)
Dept: PRIMARY CARE | Facility: CLINIC | Age: 85
End: 2025-03-14
Payer: MEDICARE

## 2025-03-14 DIAGNOSIS — J02.9 SORE THROAT: ICD-10-CM

## 2025-03-14 DIAGNOSIS — R05.1 ACUTE COUGH: Primary | ICD-10-CM

## 2025-03-14 LAB — BACTERIA UR CULT: ABNORMAL

## 2025-06-20 LAB
ALBUMIN SERPL-MCNC: 4.2 G/DL (ref 3.6–5.1)
ALP SERPL-CCNC: 72 U/L (ref 37–153)
ALT SERPL-CCNC: 13 U/L (ref 6–29)
ANION GAP SERPL CALCULATED.4IONS-SCNC: 8 MMOL/L (CALC) (ref 7–17)
AST SERPL-CCNC: 20 U/L (ref 10–35)
BILIRUB SERPL-MCNC: 0.6 MG/DL (ref 0.2–1.2)
BUN SERPL-MCNC: 25 MG/DL (ref 7–25)
CALCIUM SERPL-MCNC: 9.7 MG/DL (ref 8.6–10.4)
CHLORIDE SERPL-SCNC: 105 MMOL/L (ref 98–110)
CHOLEST SERPL-MCNC: 211 MG/DL
CHOLEST/HDLC SERPL: 2.6 (CALC)
CO2 SERPL-SCNC: 26 MMOL/L (ref 20–32)
CREAT SERPL-MCNC: 0.74 MG/DL (ref 0.6–0.95)
EGFRCR SERPLBLD CKD-EPI 2021: 80 ML/MIN/1.73M2
EST. AVERAGE GLUCOSE BLD GHB EST-MCNC: 134 MG/DL
EST. AVERAGE GLUCOSE BLD GHB EST-SCNC: 7.4 MMOL/L
GLUCOSE SERPL-MCNC: 123 MG/DL (ref 65–99)
HBA1C MFR BLD: 6.3 %
HDLC SERPL-MCNC: 82 MG/DL
LDLC SERPL CALC-MCNC: 112 MG/DL (CALC)
NONHDLC SERPL-MCNC: 129 MG/DL (CALC)
POTASSIUM SERPL-SCNC: 4.5 MMOL/L (ref 3.5–5.3)
PROT SERPL-MCNC: 6.5 G/DL (ref 6.1–8.1)
SODIUM SERPL-SCNC: 139 MMOL/L (ref 135–146)
TRIGL SERPL-MCNC: 80 MG/DL
TSH SERPL-ACNC: 1.84 MIU/L (ref 0.4–4.5)

## 2025-06-25 ENCOUNTER — OFFICE VISIT (OUTPATIENT)
Dept: PRIMARY CARE | Facility: CLINIC | Age: 85
End: 2025-06-25
Payer: MEDICARE

## 2025-06-25 VITALS
DIASTOLIC BLOOD PRESSURE: 70 MMHG | OXYGEN SATURATION: 98 % | TEMPERATURE: 97.6 F | SYSTOLIC BLOOD PRESSURE: 142 MMHG | HEIGHT: 64 IN | WEIGHT: 196 LBS | BODY MASS INDEX: 33.46 KG/M2 | HEART RATE: 74 BPM

## 2025-06-25 DIAGNOSIS — R73.03 PREDIABETES: ICD-10-CM

## 2025-06-25 DIAGNOSIS — R73.03 PRE-DIABETES: ICD-10-CM

## 2025-06-25 DIAGNOSIS — R73.9 HYPERGLYCEMIA: ICD-10-CM

## 2025-06-25 DIAGNOSIS — Z12.31 OTHER SCREENING MAMMOGRAM: ICD-10-CM

## 2025-06-25 DIAGNOSIS — M16.11 PRIMARY OSTEOARTHRITIS OF RIGHT HIP: ICD-10-CM

## 2025-06-25 DIAGNOSIS — Z00.00 ANNUAL PHYSICAL EXAM: Primary | ICD-10-CM

## 2025-06-25 DIAGNOSIS — E78.2 MIXED HYPERLIPIDEMIA: ICD-10-CM

## 2025-06-25 PROCEDURE — 1126F AMNT PAIN NOTED NONE PRSNT: CPT | Performed by: INTERNAL MEDICINE

## 2025-06-25 PROCEDURE — G0439 PPPS, SUBSEQ VISIT: HCPCS | Performed by: INTERNAL MEDICINE

## 2025-06-25 PROCEDURE — 1160F RVW MEDS BY RX/DR IN RCRD: CPT | Performed by: INTERNAL MEDICINE

## 2025-06-25 PROCEDURE — G2211 COMPLEX E/M VISIT ADD ON: HCPCS | Performed by: INTERNAL MEDICINE

## 2025-06-25 PROCEDURE — 99213 OFFICE O/P EST LOW 20 MIN: CPT | Performed by: INTERNAL MEDICINE

## 2025-06-25 PROCEDURE — 1158F ADVNC CARE PLAN TLK DOCD: CPT | Performed by: INTERNAL MEDICINE

## 2025-06-25 PROCEDURE — 99215 OFFICE O/P EST HI 40 MIN: CPT | Performed by: INTERNAL MEDICINE

## 2025-06-25 PROCEDURE — 1036F TOBACCO NON-USER: CPT | Performed by: INTERNAL MEDICINE

## 2025-06-25 PROCEDURE — 1123F ACP DISCUSS/DSCN MKR DOCD: CPT | Performed by: INTERNAL MEDICINE

## 2025-06-25 PROCEDURE — 99213 OFFICE O/P EST LOW 20 MIN: CPT | Mod: 25 | Performed by: INTERNAL MEDICINE

## 2025-06-25 PROCEDURE — 1159F MED LIST DOCD IN RCRD: CPT | Performed by: INTERNAL MEDICINE

## 2025-06-25 RX ORDER — CHOLECALCIFEROL (VITAMIN D3) 25 MCG
25 TABLET ORAL DAILY
COMMUNITY

## 2025-06-25 ASSESSMENT — PATIENT HEALTH QUESTIONNAIRE - PHQ9
2. FEELING DOWN, DEPRESSED OR HOPELESS: NOT AT ALL
SUM OF ALL RESPONSES TO PHQ9 QUESTIONS 1 AND 2: 0
1. LITTLE INTEREST OR PLEASURE IN DOING THINGS: NOT AT ALL

## 2025-06-25 ASSESSMENT — ENCOUNTER SYMPTOMS
MUSCULOSKELETAL NEGATIVE: 1
GASTROINTESTINAL NEGATIVE: 1
ALLERGIC/IMMUNOLOGIC NEGATIVE: 1
ENDOCRINE NEGATIVE: 1
PSYCHIATRIC NEGATIVE: 1
CARDIOVASCULAR NEGATIVE: 1
NEUROLOGICAL NEGATIVE: 1
CONSTITUTIONAL NEGATIVE: 1
RESPIRATORY NEGATIVE: 1
HEMATOLOGIC/LYMPHATIC NEGATIVE: 1
EYES NEGATIVE: 1

## 2025-06-25 ASSESSMENT — PAIN SCALES - GENERAL: PAINLEVEL_OUTOF10: 0-NO PAIN

## 2025-06-25 NOTE — PROGRESS NOTES
[facility]: [facility]  MEDICARE WELLNESS EXAM      Martha Cooley is a 85 y.o. female that is presenting today for Annual Exam.    Assessment/Plan    Diagnoses and all orders for this visit:  Annual physical exam  Prediabetes  Primary osteoarthritis of right hip  Mixed hyperlipidemia  -     Comprehensive Metabolic Panel; Future  -     Lipid Panel; Future  Pre-diabetes  Hyperglycemia  -     Hemoglobin A1C; Future  Other screening mammogram  -     BI mammo bilateral screening tomosynthesis; Future  Other orders  -     Follow Up In Primary Care - Medicare Annual  -     Follow Up In Primary Care - Medicare Annual; Future  We completed the medicare wellness exam today.  The lifestyle is reviewed and recommendations for diet and exercise are made. We reviewed the immunizations and cancer screening and recommendations for needed immunizations and screenings are made.  The patient is independent in all ADL, shows no clinical signs of cognitive impairment, and is not falling or at risk for such.     Also sees Dr. Uriostegui (glaucoma) and Louisa Leung CNP with [facility]    We reviewed her annual wellness visit elements:  1.  Advanced directives-she already has these documents in the chart  2.  Cancer screenings-we discussed that this at her age and she would like to continue with mammograms her next mammogram will be due in September  3.  Immunizations-she could benefit from RSV vaccine.  She otherwise is up-to-date namely she had Pneumovax in 2022 Tdap in 2024 and Shingrix x 2 in 2018    We also considered her chronic medical problems and evaluated manage such.  As part of that evaluation we reviewed her recent blood work.  1.  Hyperlipidemia-lipids adequate on her current dose of statin  2.  Prediabetes-we discussed that she is getting closer to diabetes and that she needs to continue to minimize her carbohydrate intake.    I will plan on seeing her back in a year unless needed  ADVANCED CARE  PLANNING  Advanced Care Planning was discussed with patient:  The patient has an active advanced care plan on file. The patient has an active surrogate decision-maker on file.  Encouraged the patient to confirm that Living Will and Healthcare Power of  (HCPoA) are accurate and up to date.  Encouraged the patient to confirm that our office be provided a copy of any documentation in the event that anything changes.    ACTIVITIES OF DAILY LIVING  Basic ADLs:  Bathing: Independent, Dressing: Independent, Toileting: Independent, Transferring: Independent, Continence: Independent, Feeding: Independent.    Instrumental ADLs:  Ability to use phone: Independent, Shopping: Independent, Cooking: Independent, House-keeping: Independent, Laundry: Independent, Transportation: Independent, Medication Management: Independent, Finance Management: Independent.    Subjective   This 85-year-old is here for her annual wellness visit and follow-up for her chronic medical problems was last seen about a year ago really has had no problems since the time of her last visit.    We reviewed her medications together she has stopped taking aspirin over the course of the last year.  She does continue with her Lipitor and is not really having any problems she really has minimal additional doctors that she sees      Review of Systems   Constitutional: Negative.    HENT: Negative.     Eyes: Negative.    Respiratory: Negative.     Cardiovascular: Negative.    Gastrointestinal: Negative.    Endocrine: Negative.    Genitourinary: Negative.    Musculoskeletal: Negative.    Skin: Negative.    Allergic/Immunologic: Negative.    Neurological: Negative.    Hematological: Negative.    Psychiatric/Behavioral: Negative.     All other systems reviewed and are negative.    Objective   Vitals:    06/25/25 0926   BP: 142/70   Pulse: 74   Temp: 36.4 °C (97.6 °F)   SpO2: 98%      Body mass index is 33.64 kg/m².  Physical Exam  Vitals and nursing note  reviewed.   Constitutional:       General: She is not in acute distress.     Appearance: Normal appearance. She is not ill-appearing.   HENT:      Head: Normocephalic and atraumatic.      Right Ear: Hearing, tympanic membrane, ear canal and external ear normal. There is no impacted cerumen.      Left Ear: Hearing, tympanic membrane, ear canal and external ear normal. There is no impacted cerumen.      Ears:      Comments: Subjectively decreased but pt hearing ok on basic exam     Nose: Nose normal.      Mouth/Throat:      Mouth: Mucous membranes are moist.      Pharynx: Oropharynx is clear. No oropharyngeal exudate or posterior oropharyngeal erythema.   Eyes:      General: No scleral icterus.        Right eye: No discharge.         Left eye: No discharge.      Extraocular Movements: Extraocular movements intact.      Conjunctiva/sclera: Conjunctivae normal.      Pupils: Pupils are equal, round, and reactive to light.   Neck:      Vascular: No carotid bruit.   Cardiovascular:      Rate and Rhythm: Normal rate and regular rhythm.      Pulses: Normal pulses.      Heart sounds: Normal heart sounds. No murmur heard.     No friction rub. No gallop.   Pulmonary:      Effort: Pulmonary effort is normal. No respiratory distress.      Breath sounds: Normal breath sounds.   Abdominal:      General: Abdomen is flat. Bowel sounds are normal. There is no distension.      Palpations: Abdomen is soft.      Tenderness: There is no abdominal tenderness.      Hernia: No hernia is present.   Musculoskeletal:         General: No swelling or tenderness. Normal range of motion.   Lymphadenopathy:      Cervical: No cervical adenopathy.   Skin:     General: Skin is warm and dry.      Capillary Refill: Capillary refill takes less than 2 seconds.      Coloration: Skin is not jaundiced.      Findings: No rash.   Neurological:      General: No focal deficit present.      Mental Status: She is alert and oriented to person, place, and time.  "Mental status is at baseline.   Psychiatric:         Mood and Affect: Mood normal.         Behavior: Behavior normal.       Diagnostic Results   Lab Results   Component Value Date    GLUCOSE 123 (H) 06/19/2025    CALCIUM 9.7 06/19/2025     06/19/2025    K 4.5 06/19/2025    CO2 26 06/19/2025     06/19/2025    BUN 25 06/19/2025    CREATININE 0.74 06/19/2025     Lab Results   Component Value Date    ALT 13 06/19/2025    AST 20 06/19/2025    ALKPHOS 72 06/19/2025    BILITOT 0.6 06/19/2025     Lab Results   Component Value Date    WBC 7.6 06/14/2024    HGB 14.4 06/14/2024    HCT 43.1 06/14/2024     06/14/2024     06/14/2024     Lab Results   Component Value Date    CHOL 211 (H) 06/19/2025    CHOL 206 (H) 06/14/2024    CHOL 190 06/08/2023     Lab Results   Component Value Date    HDL 82 06/19/2025    HDL 78.0 06/14/2024    HDL 82 06/08/2023     Lab Results   Component Value Date    LDLCALC 112 (H) 06/19/2025    LDLCALC 113 06/14/2024    LDLCALC 97 06/08/2023     Lab Results   Component Value Date    TRIG 80 06/19/2025    TRIG 76 06/14/2024    TRIG 56 06/08/2023     No components found for: \"CHOLHDL\"  Lab Results   Component Value Date    HGBA1C 6.3 (H) 06/19/2025     Other labs not included in the list above reviewed either before or during this encounter.    History   Medical History[1]  Surgical History[2]  Family History[3]  Social History     Socioeconomic History    Marital status:      Spouse name: Not on file    Number of children: Not on file    Years of education: Not on file    Highest education level: Not on file   Occupational History    Not on file   Tobacco Use    Smoking status: Never    Smokeless tobacco: Never   Vaping Use    Vaping status: Never Used   Substance and Sexual Activity    Alcohol use: Yes     Comment: social    Drug use: Never    Sexual activity: Not on file   Other Topics Concern    Not on file   Social History Narrative    Not on file     Social Drivers of " Health     Financial Resource Strain: Not on file   Food Insecurity: Not on file   Transportation Needs: Not on file   Physical Activity: Not on file   Stress: Not on file   Social Connections: Not on file   Intimate Partner Violence: Not on file   Housing Stability: Not on file     Allergies[4]  Medications Ordered Prior to Encounter[5]  Immunization History   Administered Date(s) Administered    Flu vaccine (IIV4), preservative free *Check age/dose* 12/27/2021    Flu vaccine, quadrivalent, high-dose, preservative free, age 65y+ (FLUZONE) 10/27/2020, 10/27/2022, 10/17/2023    Flu vaccine, trivalent, preservative free, HIGH-DOSE, age 65y+ (Fluzone) 12/14/2011, 01/15/2013, 10/30/2013, 09/11/2014, 09/28/2016, 10/04/2017, 10/17/2018, 10/13/2019    Influenza, seasonal, injectable 11/11/2008, 11/11/2010, 10/20/2015    Moderna COVID-19 vaccine, 12 years and older (50mcg/0.5mL)(Spikevax) 10/29/2024    Moderna SARS-CoV-2 Vaccination 02/05/2021, 03/05/2021, 11/12/2021, 05/11/2022    Pfizer COVID-19 vaccine, 12 years and older, (30mcg/0.3mL) (Comirnaty) 11/28/2023    Pfizer COVID-19 vaccine, bivalent, age 12 years and older (30 mcg/0.3 mL) 09/20/2022    Pneumococcal conjugate vaccine, 13-valent (PREVNAR 13) 03/04/2015    Pneumococcal conjugate vaccine, 20-valent (PREVNAR 20) 12/07/2022    Pneumococcal polysaccharide vaccine, 23-valent, age 2 years and older (PNEUMOVAX 23) 05/11/2007, 06/19/2012    Td vaccine, age 7 years and older (TDVAX) 03/08/2004    Tdap vaccine, age 7 year and older (BOOSTRIX, ADACEL) 01/21/2014    Zoster vaccine, recombinant, adult (SHINGRIX) 04/02/2018, 04/09/2018, 08/06/2018, 09/11/2018    Zoster, live 05/11/2007     Patient's medical history was reviewed and updated either before or during this encounter.     Gui Glez MD       [1]   Past Medical History:  Diagnosis Date    Bleeding internal hemorrhoids 09/11/2023    History of total left hip arthroplasty 09/11/2023    History of total left  hip replacement 2024    History of total right hip arthroplasty 2023    History of total right hip replacement 2024    Hyperlipidemia 2023    Insomnia 2023    Macrocytosis without anemia 2023    Osteoarthritis of right hip 2023    Prediabetes 2023    Primary localized osteoarthritis of pelvic region and thigh 2023    Primary osteoarthritis 2023    Primary osteoarthritis of left hip 2023    Sensorineural hearing loss, bilateral 2017    Squamous cell carcinoma of skin of other parts of face 2021    Transient global amnesia 2023    Transient ischemic attack 2023   [2]   Past Surgical History:  Procedure Laterality Date    CATARACT EXTRACTION Bilateral 2018    COLONOSCOPY  2006    also ,     HYSTEROSCOPY      MR HEAD ANGIO WO IV CONTRAST  2023    MR HEAD ANGIO WO IV CONTRAST [facility]    TOTAL HIP ARTHROPLASTY Right     TOTAL HIP ARTHROPLASTY Left    [3]   Family History  Problem Relation Name Age of Onset    Ovarian cancer Mother           of    Heart disease Father      Diabetes Father      Breast cancer Sister     [4] No Known Allergies  [5]   Current Outpatient Medications on File Prior to Visit   Medication Sig Dispense Refill    atorvastatin (Lipitor) 20 mg tablet Take 1 tablet (20 mg) by mouth once daily. 90 tablet 3    cholecalciferol (Vitamin D3) 25 mcg (1,000 units) tablet Take 1 tablet (25 mcg) by mouth once daily.      folic acid/multivit-min/lutein (CENTRUM SILVER ORAL) as directed Orally      latanoprost (Xalatan) 0.005 % ophthalmic solution 1 drop into affected eye in the evening Ophthalmic Once a day      [DISCONTINUED] aspirin 81 mg EC tablet 1 tablet Orally Once a day (Patient not taking: Reported on 2025)      [DISCONTINUED] fluorouracil (Efudex) 5 % cream Apply twice daily for 14 days to the upper lip (Patient not taking: Reported on 2025)      [DISCONTINUED]  NON FORMULARY Vitamin D 25 MCG (1000 UT) - 1 tablet Orally Once a day (Patient not taking: Reported on 6/25/2025)       No current facility-administered medications on file prior to visit.

## 2025-06-25 NOTE — PATIENT INSTRUCTIONS
It was nice to see you today for follow-up and for your annual wellness visit here several of the things that we discussed:  1.  Advanced directives-I am glad to see you already have your power of  forms in the chart  2.  Cancer screenings I ordered mammogram for you for September  3.  Immunizations-you could benefit from that RSV vaccine as we discussed.  On the other hand, you are already up-to-date on your other shots namely you had pneumonia shot in 2022, shingles vaccines in 2018 and Tdap (tetanus whooping cough) in 2024  4.  History of prediabetes-as we discussed your sugars are getting closer to the edge-please continue to stay active, keep your weight down, and continue to watch your carbs (both sugars and starches)  5.  History of high cholesterol-you seem to be doing fine.    I hope you have another healthy year I will plan on seeing you in 1 year unless needed.

## 2025-09-04 ENCOUNTER — HOSPITAL ENCOUNTER (OUTPATIENT)
Dept: RADIOLOGY | Facility: CLINIC | Age: 85
Discharge: HOME | End: 2025-09-04
Payer: MEDICARE

## 2025-09-04 VITALS — WEIGHT: 196 LBS | HEIGHT: 64 IN | BODY MASS INDEX: 33.46 KG/M2

## 2025-09-04 DIAGNOSIS — Z12.31 OTHER SCREENING MAMMOGRAM: ICD-10-CM

## 2025-09-04 PROCEDURE — 77063 BREAST TOMOSYNTHESIS BI: CPT | Performed by: RADIOLOGY

## 2025-09-04 PROCEDURE — 77067 SCR MAMMO BI INCL CAD: CPT | Performed by: RADIOLOGY

## 2025-09-04 PROCEDURE — 77063 BREAST TOMOSYNTHESIS BI: CPT
